# Patient Record
Sex: MALE | URBAN - METROPOLITAN AREA
[De-identification: names, ages, dates, MRNs, and addresses within clinical notes are randomized per-mention and may not be internally consistent; named-entity substitution may affect disease eponyms.]

---

## 2018-10-25 ENCOUNTER — NEW PATIENT (OUTPATIENT)
Dept: URBAN - METROPOLITAN AREA CLINIC 44 | Facility: CLINIC | Age: 58
End: 2018-10-25
Payer: COMMERCIAL

## 2018-10-25 PROCEDURE — 92004 COMPRE OPH EXAM NEW PT 1/>: CPT | Performed by: OPTOMETRIST

## 2018-10-25 PROCEDURE — 92133 CPTRZD OPH DX IMG PST SGM ON: CPT | Performed by: OPTOMETRIST

## 2018-10-25 ASSESSMENT — INTRAOCULAR PRESSURE
OD: 21
OS: 23

## 2018-12-06 ENCOUNTER — FOLLOW UP ESTABLISHED (OUTPATIENT)
Dept: URBAN - METROPOLITAN AREA CLINIC 44 | Facility: CLINIC | Age: 58
End: 2018-12-06
Payer: COMMERCIAL

## 2018-12-06 DIAGNOSIS — H00.025 HORDEOLUM INTERNUM (MEIBOMIAN GLAND DYSFUNCTION), LEFT LOWER LID: ICD-10-CM

## 2018-12-06 DIAGNOSIS — H40.023 OPEN ANGLE WITH BORDERLINE FINDINGS, HIGH RISK, BILATERAL: ICD-10-CM

## 2018-12-06 DIAGNOSIS — H00.022 HORDEOLUM INTERNUM (MEIBOMIAN GLAND DYSFUNCTION), RIGHT LOWER LID: Primary | ICD-10-CM

## 2018-12-06 PROCEDURE — 92250 FUNDUS PHOTOGRAPHY W/I&R: CPT | Performed by: OPTOMETRIST

## 2018-12-06 PROCEDURE — 76514 ECHO EXAM OF EYE THICKNESS: CPT | Performed by: OPTOMETRIST

## 2018-12-06 PROCEDURE — 92083 EXTENDED VISUAL FIELD XM: CPT | Performed by: OPTOMETRIST

## 2018-12-06 PROCEDURE — 92014 COMPRE OPH EXAM EST PT 1/>: CPT | Performed by: OPTOMETRIST

## 2018-12-06 RX ORDER — LATANOPROST 50 UG/ML
0.005 % SOLUTION OPHTHALMIC
Qty: 3 | Refills: 3 | Status: INACTIVE
Start: 2018-12-06 | End: 2019-06-06

## 2018-12-06 ASSESSMENT — INTRAOCULAR PRESSURE
OD: 21
OS: 22

## 2018-12-06 ASSESSMENT — VISUAL ACUITY
OD: 20/20
OS: 20/20

## 2018-12-06 ASSESSMENT — KERATOMETRY
OS: 43.50
OD: 43.50

## 2019-06-06 ENCOUNTER — FOLLOW UP ESTABLISHED (OUTPATIENT)
Dept: URBAN - METROPOLITAN AREA CLINIC 44 | Facility: CLINIC | Age: 59
End: 2019-06-06
Payer: COMMERCIAL

## 2019-06-06 PROCEDURE — 92014 COMPRE OPH EXAM EST PT 1/>: CPT | Performed by: OPTOMETRIST

## 2019-06-06 PROCEDURE — 92133 CPTRZD OPH DX IMG PST SGM ON: CPT | Performed by: OPTOMETRIST

## 2019-06-06 RX ORDER — LATANOPROST 50 UG/ML
0.005 % SOLUTION OPHTHALMIC
Qty: 3 | Refills: 3 | Status: INACTIVE
Start: 2019-06-06 | End: 2021-12-13

## 2019-06-06 ASSESSMENT — VISUAL ACUITY
OD: 20/20
OS: 20/20

## 2019-06-06 ASSESSMENT — INTRAOCULAR PRESSURE
OS: 18
OD: 20

## 2019-06-06 ASSESSMENT — KERATOMETRY: OD: 43.63

## 2020-01-10 ENCOUNTER — FOLLOW UP ESTABLISHED (OUTPATIENT)
Dept: URBAN - METROPOLITAN AREA CLINIC 44 | Facility: CLINIC | Age: 60
End: 2020-01-10
Payer: COMMERCIAL

## 2020-01-10 DIAGNOSIS — Z98.890 OTHER SPECIFIED POSTPROCEDURAL STATES: ICD-10-CM

## 2020-01-10 PROCEDURE — 92014 COMPRE OPH EXAM EST PT 1/>: CPT | Performed by: OPTOMETRIST

## 2020-01-10 PROCEDURE — 92083 EXTENDED VISUAL FIELD XM: CPT | Performed by: OPTOMETRIST

## 2020-01-10 RX ORDER — BRIMONIDINE TARTRATE 2 MG/ML
0.2 % SOLUTION/ DROPS OPHTHALMIC
Qty: 1 | Refills: 5 | Status: INACTIVE
Start: 2020-01-10 | End: 2020-10-29

## 2020-01-10 ASSESSMENT — VISUAL ACUITY
OD: 20/20
OS: 20/25

## 2020-01-10 ASSESSMENT — KERATOMETRY
OD: 44.00
OS: 43.75

## 2020-01-10 ASSESSMENT — INTRAOCULAR PRESSURE
OD: 22
OS: 21
OS: 20

## 2020-04-29 ENCOUNTER — FOLLOW UP ESTABLISHED (OUTPATIENT)
Dept: URBAN - METROPOLITAN AREA CLINIC 44 | Facility: CLINIC | Age: 60
End: 2020-04-29
Payer: COMMERCIAL

## 2020-04-29 PROCEDURE — 92012 INTRM OPH EXAM EST PATIENT: CPT | Performed by: OPTOMETRIST

## 2020-04-29 ASSESSMENT — INTRAOCULAR PRESSURE
OS: 20
OD: 22
OD: 18
OS: 18

## 2020-10-29 ENCOUNTER — FOLLOW UP ESTABLISHED (OUTPATIENT)
Dept: URBAN - METROPOLITAN AREA CLINIC 44 | Facility: CLINIC | Age: 60
End: 2020-10-29
Payer: COMMERCIAL

## 2020-10-29 PROCEDURE — 92012 INTRM OPH EXAM EST PATIENT: CPT | Performed by: OPTOMETRIST

## 2020-10-29 PROCEDURE — 92133 CPTRZD OPH DX IMG PST SGM ON: CPT | Performed by: OPTOMETRIST

## 2020-10-29 RX ORDER — BRIMONIDINE TARTRATE 2 MG/ML
0.2 % SOLUTION/ DROPS OPHTHALMIC
Qty: 3 | Refills: 3 | Status: INACTIVE
Start: 2020-10-29 | End: 2021-11-09

## 2020-10-29 ASSESSMENT — INTRAOCULAR PRESSURE
OS: 18
OD: 18

## 2021-04-26 ENCOUNTER — OFFICE VISIT (OUTPATIENT)
Dept: URBAN - METROPOLITAN AREA CLINIC 44 | Facility: CLINIC | Age: 61
End: 2021-04-26
Payer: COMMERCIAL

## 2021-04-26 DIAGNOSIS — H25.13 AGE-RELATED NUCLEAR CATARACT, BILATERAL: ICD-10-CM

## 2021-04-26 DIAGNOSIS — E11.9 TYPE 2 DIABETES MELLITUS WITHOUT COMPLICATIONS: ICD-10-CM

## 2021-04-26 DIAGNOSIS — H52.4 PRESBYOPIA: ICD-10-CM

## 2021-04-26 DIAGNOSIS — Z79.84 LONG TERM (CURRENT) USE OF ORAL HYPOGLYCEMIC DRUGS: ICD-10-CM

## 2021-04-26 DIAGNOSIS — R73.03 PREDIABETES: ICD-10-CM

## 2021-04-26 DIAGNOSIS — H04.123 DRY EYE SYNDROME OF BILATERAL LACRIMAL GLANDS: ICD-10-CM

## 2021-04-26 PROCEDURE — 92134 CPTRZ OPH DX IMG PST SGM RTA: CPT | Performed by: OPTOMETRIST

## 2021-04-26 PROCEDURE — 92083 EXTENDED VISUAL FIELD XM: CPT | Performed by: OPTOMETRIST

## 2021-04-26 PROCEDURE — 99214 OFFICE O/P EST MOD 30 MIN: CPT | Performed by: OPTOMETRIST

## 2021-04-26 ASSESSMENT — INTRAOCULAR PRESSURE
OD: 18
OS: 18

## 2021-04-26 ASSESSMENT — VISUAL ACUITY
OD: 20/20
OS: 20/25

## 2021-04-26 ASSESSMENT — KERATOMETRY
OD: 43.13
OS: 43.50

## 2021-04-26 NOTE — IMPRESSION/PLAN
Impression: Type 2 diabetes mellitus without complications: N96.3. Plan: No diabetic retinopathy. Recommend yearly diabetic eye exam. Discussed with patient importance of good blood sugar control.

## 2021-04-26 NOTE — IMPRESSION/PLAN
Impression: Open angle with borderline findings, high risk, bilateral Plan: Cupping OU Family hx: none Pachymetry: 634/131 IOP: 18/18 Current meds: brimonidine BID OU Drop hx: d/c latanoprost due to no effect Hx of sleep apnea --> no beta blockers Baseline OCT RNFL: abnormal OU Updated OCT RNFL (10/29/20): OD 76 (bdl overall, superior thinning) OS 77 (bdl overall, bdl sup thinning) HVF (04/26/21): OD full (unreliable) OS mild inferior defect (reliable) Optos taken: 04/26/21 IOP stable. VF stable. Continue with current meds. RTC 6 months for IOP + OCT RNFL.

## 2021-04-26 NOTE — IMPRESSION/PLAN
Impression: Age-related nuclear cataract, bilateral: H25.13. Plan: Not visually significant. RTC if vision changes.

## 2021-12-13 ENCOUNTER — OFFICE VISIT (OUTPATIENT)
Dept: URBAN - METROPOLITAN AREA CLINIC 44 | Facility: CLINIC | Age: 61
End: 2021-12-13
Payer: COMMERCIAL

## 2021-12-13 PROCEDURE — 92133 CPTRZD OPH DX IMG PST SGM ON: CPT | Performed by: OPTOMETRIST

## 2021-12-13 PROCEDURE — 99213 OFFICE O/P EST LOW 20 MIN: CPT | Performed by: OPTOMETRIST

## 2021-12-13 RX ORDER — BRIMONIDINE TARTRATE 2 MG/ML
0.2 % SOLUTION/ DROPS OPHTHALMIC
Qty: 15 | Refills: 3 | Status: ACTIVE
Start: 2021-12-13

## 2021-12-13 ASSESSMENT — INTRAOCULAR PRESSURE
OD: 20
OS: 18

## 2021-12-13 NOTE — IMPRESSION/PLAN
Impression: Open angle with borderline findings, high risk, bilateral Plan: Cupping OU Family hx: none Pachymetry: 559/628 IOP: 20/18 Current meds: brimonidine BID OU Drop hx: d/c latanoprost due to no effect Hx of sleep apnea --> no beta blockers Baseline OCT RNFL: abnormal OU
OCT RNFL (12/13/21): OD 79 (wnl overall, superior thinning) OS 78 (wnl) HVF (04/26/21): OD full (unreliable) OS mild inferior defect (reliable) Optos taken: 04/26/21 IOP borderline but OCT stable. Continue with current meds.   RTC 6 months for complete exam + 24-2 HVF

## 2022-06-13 ENCOUNTER — OFFICE VISIT (OUTPATIENT)
Dept: URBAN - METROPOLITAN AREA CLINIC 44 | Facility: CLINIC | Age: 62
End: 2022-06-13
Payer: COMMERCIAL

## 2022-06-13 DIAGNOSIS — E11.9 TYPE 2 DIABETES MELLITUS WITHOUT COMPLICATIONS: ICD-10-CM

## 2022-06-13 DIAGNOSIS — H25.13 AGE-RELATED NUCLEAR CATARACT, BILATERAL: ICD-10-CM

## 2022-06-13 DIAGNOSIS — Z79.84 LONG TERM (CURRENT) USE OF ORAL HYPOGLYCEMIC DRUGS: ICD-10-CM

## 2022-06-13 DIAGNOSIS — H04.123 DRY EYE SYNDROME OF BILATERAL LACRIMAL GLANDS: Primary | ICD-10-CM

## 2022-06-13 DIAGNOSIS — H40.023 OPEN ANGLE WITH BORDERLINE FINDINGS, HIGH RISK, BILATERAL: ICD-10-CM

## 2022-06-13 PROCEDURE — 92083 EXTENDED VISUAL FIELD XM: CPT | Performed by: OPTOMETRIST

## 2022-06-13 PROCEDURE — 99214 OFFICE O/P EST MOD 30 MIN: CPT | Performed by: OPTOMETRIST

## 2022-06-13 ASSESSMENT — KERATOMETRY
OD: 43.75
OS: 44.00

## 2022-06-13 ASSESSMENT — INTRAOCULAR PRESSURE
OS: 16
OD: 13

## 2022-06-13 ASSESSMENT — VISUAL ACUITY
OD: 20/20
OS: 20/20

## 2022-06-13 NOTE — IMPRESSION/PLAN
Impression: Dry eye syndrome of bilateral lacrimal glands: H04.123. Plan: Dry Eye Handout given:
    1) Artificial tears 4x/day 2) Begin gel at night 3) Perform warm compresses daily     4) Begin lid scrubs

## 2022-06-13 NOTE — IMPRESSION/PLAN
Impression: Type 2 diabetes mellitus without complications: O00.4. Plan: No diabetic retinopathy. Recommend yearly diabetic eye exam. Discussed with patient importance of good blood sugar control. Send report to PCP.

## 2022-06-13 NOTE — IMPRESSION/PLAN
Impression: Open angle with borderline findings, high risk, bilateral Plan: Cupping OU Family hx: none Pachymetry: 328/017 IOP: 13/16 Current meds: brimonidine BID OU Drop hx: d/c latanoprost due to no effect Hx of sleep apnea --> no beta blockers Baseline OCT RNFL: abnormal OU
OCT RNFL (12/13/21): OD 79 (wnl overall, superior thinning) OS 78 (wnl) HVF (06/13/22): OD full (unreliable) OS mild scatter (reliable) Optos taken: 04/26/21 HVF okay, but unreliable OD. Continue with current meds.   RTC 6 months for IOP + OCT RNFL

## 2022-12-13 ENCOUNTER — OFFICE VISIT (OUTPATIENT)
Dept: URBAN - METROPOLITAN AREA CLINIC 44 | Facility: CLINIC | Age: 62
End: 2022-12-13
Payer: COMMERCIAL

## 2022-12-13 DIAGNOSIS — H04.123 DRY EYE SYNDROME OF BILATERAL LACRIMAL GLANDS: ICD-10-CM

## 2022-12-13 DIAGNOSIS — H40.023 OPEN ANGLE WITH BORDERLINE FINDINGS, HIGH RISK, BILATERAL: Primary | ICD-10-CM

## 2022-12-13 PROCEDURE — 99213 OFFICE O/P EST LOW 20 MIN: CPT | Performed by: OPTOMETRIST

## 2022-12-13 PROCEDURE — 92133 CPTRZD OPH DX IMG PST SGM ON: CPT | Performed by: OPTOMETRIST

## 2022-12-13 ASSESSMENT — INTRAOCULAR PRESSURE
OS: 16
OD: 14

## 2022-12-13 NOTE — IMPRESSION/PLAN
Impression: Open angle with borderline findings, high risk, bilateral Plan: Cupping OU Family hx: none Pachymetry: 223/093 IOP: 14/16 Current meds: brimonidine BID OU Drop hx: d/c latanoprost due to no effect Hx of sleep apnea --> no beta blockers OCT RNFL (12/13/22): OD 80 (wnl) OS 78 (wnl overall, bdl sup thinning) HVF (06/13/22): OD full (unreliable) OS mild scatter (reliable) Optos taken: 04/26/21 IOP okay. OCT stable. Continue current meds.   RTC 6 months for Complete Exam + 24-2 HVF

## 2022-12-13 NOTE — IMPRESSION/PLAN
Impression: Dry eye syndrome of bilateral lacrimal glands: H04.123. Plan: Vision fluctuations in the evening. Explained this is not a glasses issue but more likely a dry eye issue. Patient has been using AT's roughly QD but no relief. Needs to be diligent with daily treatment. Dry Eye Handout given:
    1) Artificial tears 4x/day 2) Begin gel at night 3) Perform warm compresses daily 4) Begin lid scrubs RTC if symptoms do not improve after 1 month.

## 2023-06-14 ENCOUNTER — OFFICE VISIT (OUTPATIENT)
Dept: URBAN - METROPOLITAN AREA CLINIC 44 | Facility: CLINIC | Age: 63
End: 2023-06-14
Payer: COMMERCIAL

## 2023-06-14 DIAGNOSIS — H40.023 OPEN ANGLE WITH BORDERLINE FINDINGS, HIGH RISK, BILATERAL: ICD-10-CM

## 2023-06-14 DIAGNOSIS — H04.123 DRY EYE SYNDROME OF BILATERAL LACRIMAL GLANDS: Primary | ICD-10-CM

## 2023-06-14 DIAGNOSIS — E11.9 TYPE 2 DIABETES MELLITUS WITHOUT COMPLICATIONS: ICD-10-CM

## 2023-06-14 DIAGNOSIS — Z79.84 LONG TERM (CURRENT) USE OF ORAL HYPOGLYCEMIC DRUGS: ICD-10-CM

## 2023-06-14 DIAGNOSIS — H25.13 AGE-RELATED NUCLEAR CATARACT, BILATERAL: ICD-10-CM

## 2023-06-14 PROCEDURE — 92083 EXTENDED VISUAL FIELD XM: CPT | Performed by: OPTOMETRIST

## 2023-06-14 PROCEDURE — 99214 OFFICE O/P EST MOD 30 MIN: CPT | Performed by: OPTOMETRIST

## 2023-06-14 ASSESSMENT — KERATOMETRY
OD: 43.00
OS: 43.50

## 2023-06-14 ASSESSMENT — VISUAL ACUITY
OS: 20/20
OD: 20/20

## 2023-06-14 ASSESSMENT — INTRAOCULAR PRESSURE
OD: 20
OS: 20

## 2023-06-14 NOTE — IMPRESSION/PLAN
Impression: Open angle with borderline findings, high risk, bilateral Plan: Cupping OU Family hx: none Pachymetry: 973/343 IOP: 20/20 Current meds: brimonidine BID OU Drop hx: d/c latanoprost due to no effect Hx of sleep apnea --> no beta blockers OCT RNFL (12/13/22): OD 80 (wnl) OS 78 (wnl overall, bdl sup thinning) HVF (06/14/23): OD essentially full (unreliable) OS essentially full (reliable) Optos taken: 06/14/23 IOP okay. VF okay. Continue current meds.   RTC 6 months for IOP + OCT RNFL

## 2023-06-14 NOTE — IMPRESSION/PLAN
Impression: Dry eye syndrome of bilateral lacrimal glands: H04.123.  Plan: Doing well with Retaine AT's

## 2023-06-14 NOTE — IMPRESSION/PLAN
Impression: Type 2 diabetes mellitus without complications: L51.5. Plan: No Non-Proliferative Diabetic Retinopathy, no Diabetic Macular Edema and no Neovascularization of the iris, disc, or elsewhere. Discussed ocular and systemic benefits of blood sugar control. Check annually.

## 2023-12-11 ENCOUNTER — OFFICE VISIT (OUTPATIENT)
Dept: URBAN - METROPOLITAN AREA CLINIC 44 | Facility: CLINIC | Age: 63
End: 2023-12-11
Payer: COMMERCIAL

## 2023-12-11 DIAGNOSIS — H40.023 OPEN ANGLE WITH BORDERLINE FINDINGS, HIGH RISK, BILATERAL: Primary | ICD-10-CM

## 2023-12-11 PROCEDURE — 99213 OFFICE O/P EST LOW 20 MIN: CPT | Performed by: OPTOMETRIST

## 2023-12-11 PROCEDURE — 92133 CPTRZD OPH DX IMG PST SGM ON: CPT | Performed by: OPTOMETRIST

## 2023-12-11 RX ORDER — BRIMONIDINE TARTRATE 2 MG/ML
0.2 % SOLUTION/ DROPS OPHTHALMIC
Qty: 30 | Refills: 3 | Status: ACTIVE
Start: 2023-12-11

## 2023-12-11 ASSESSMENT — INTRAOCULAR PRESSURE
OS: 16
OD: 21
OS: 21

## 2024-06-10 ENCOUNTER — OFFICE VISIT (OUTPATIENT)
Dept: URBAN - METROPOLITAN AREA CLINIC 44 | Facility: CLINIC | Age: 64
End: 2024-06-10
Payer: COMMERCIAL

## 2024-06-10 DIAGNOSIS — H25.13 AGE-RELATED NUCLEAR CATARACT, BILATERAL: ICD-10-CM

## 2024-06-10 DIAGNOSIS — E11.9 TYPE 2 DIABETES MELLITUS WITHOUT COMPLICATIONS: ICD-10-CM

## 2024-06-10 DIAGNOSIS — H04.123 DRY EYE SYNDROME OF BILATERAL LACRIMAL GLANDS: Primary | ICD-10-CM

## 2024-06-10 DIAGNOSIS — H40.023 OPEN ANGLE WITH BORDERLINE FINDINGS, HIGH RISK, BILATERAL: ICD-10-CM

## 2024-06-10 PROCEDURE — 92083 EXTENDED VISUAL FIELD XM: CPT | Performed by: OPTOMETRIST

## 2024-06-10 PROCEDURE — 92133 CPTRZD OPH DX IMG PST SGM ON: CPT | Performed by: OPTOMETRIST

## 2024-06-10 PROCEDURE — 99214 OFFICE O/P EST MOD 30 MIN: CPT | Performed by: OPTOMETRIST

## 2024-06-10 ASSESSMENT — KERATOMETRY: OS: 44.38

## 2024-06-10 ASSESSMENT — VISUAL ACUITY
OD: 20/20
OS: 20/20

## 2024-06-10 ASSESSMENT — INTRAOCULAR PRESSURE
OD: 14
OS: 14

## 2025-01-23 ENCOUNTER — OFFICE VISIT (OUTPATIENT)
Dept: URBAN - METROPOLITAN AREA CLINIC 44 | Facility: CLINIC | Age: 65
End: 2025-01-23
Payer: COMMERCIAL

## 2025-01-23 DIAGNOSIS — H40.023 OPEN ANGLE WITH BORDERLINE FINDINGS, HIGH RISK, BILATERAL: Primary | ICD-10-CM

## 2025-01-23 PROCEDURE — 92134 CPTRZ OPH DX IMG PST SGM RTA: CPT | Performed by: OPTOMETRIST

## 2025-01-23 PROCEDURE — 99213 OFFICE O/P EST LOW 20 MIN: CPT | Performed by: OPTOMETRIST

## 2025-01-23 ASSESSMENT — INTRAOCULAR PRESSURE
OD: 17
OS: 20
OD: 15
OS: 15

## 2025-07-23 ENCOUNTER — OFFICE VISIT (OUTPATIENT)
Dept: URBAN - METROPOLITAN AREA CLINIC 44 | Facility: CLINIC | Age: 65
End: 2025-07-23
Payer: COMMERCIAL

## 2025-07-23 DIAGNOSIS — E11.9 TYPE 2 DIABETES MELLITUS WITHOUT COMPLICATIONS: ICD-10-CM

## 2025-07-23 DIAGNOSIS — H40.023 OPEN ANGLE WITH BORDERLINE FINDINGS, HIGH RISK, BILATERAL: ICD-10-CM

## 2025-07-23 DIAGNOSIS — H04.123 DRY EYE SYNDROME OF BILATERAL LACRIMAL GLANDS: Primary | ICD-10-CM

## 2025-07-23 DIAGNOSIS — H25.13 AGE-RELATED NUCLEAR CATARACT, BILATERAL: ICD-10-CM

## 2025-07-23 PROCEDURE — 92133 CPTRZD OPH DX IMG PST SGM ON: CPT | Performed by: OPTOMETRIST

## 2025-07-23 PROCEDURE — 99214 OFFICE O/P EST MOD 30 MIN: CPT | Performed by: OPTOMETRIST

## 2025-07-23 RX ORDER — CYCLOSPORINE 0.5 MG/ML
0.05 % EMULSION OPHTHALMIC
Qty: 180 | Refills: 0 | Status: ACTIVE
Start: 2025-07-23

## 2025-07-23 ASSESSMENT — INTRAOCULAR PRESSURE
OS: 17
OD: 22
OD: 17
OS: 22

## 2025-07-23 ASSESSMENT — KERATOMETRY
OS: 44.75
OD: 44.88

## 2025-07-23 ASSESSMENT — VISUAL ACUITY
OS: 20/25
OD: 20/25